# Patient Record
Sex: FEMALE | Race: BLACK OR AFRICAN AMERICAN | NOT HISPANIC OR LATINO
[De-identification: names, ages, dates, MRNs, and addresses within clinical notes are randomized per-mention and may not be internally consistent; named-entity substitution may affect disease eponyms.]

---
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
## 2025-05-22 ENCOUNTER — OFFICE (OUTPATIENT)
Dept: URBAN - METROPOLITAN AREA CLINIC 12 | Facility: CLINIC | Age: 20
Setting detail: OPHTHALMOLOGY
End: 2025-05-22
Payer: COMMERCIAL

## 2025-05-22 DIAGNOSIS — H50.51: ICD-10-CM

## 2025-05-22 DIAGNOSIS — H16.223: ICD-10-CM

## 2025-05-22 PROCEDURE — 92012 INTRM OPH EXAM EST PATIENT: CPT | Performed by: OPTOMETRIST

## 2025-05-22 ASSESSMENT — REFRACTION_CURRENTRX
OD_AXIS: 171
OS_AXIS: 012
OD_SPHERE: +6.50
OS_CYLINDER: -2.75
OD_OVR_VA: 20/
OD_CYLINDER: -2.75
OS_VPRISM_DIRECTION: SV
OS_SPHERE: +6.50
OD_VPRISM_DIRECTION: SV
OS_OVR_VA: 20/

## 2025-05-22 ASSESSMENT — SUPERFICIAL PUNCTATE KERATITIS (SPK)
OS_SPK: T
OD_SPK: T

## 2025-05-22 ASSESSMENT — REFRACTION_MANIFEST
OS_VA1: 20/20
OD_SPHERE: +5.50
OD_AXIS: 170
OS_VA1: 20/20
OD_VA1: 20/20
OD_VA1: 20/20
OS_CYLINDER: -2.75
OS_SPHERE: +5.25
OS_AXIS: 015
OD_CYLINDER: -2.75
OU_VA: 20/20

## 2025-05-22 ASSESSMENT — TONOMETRY
OS_IOP_MMHG: 12
OD_IOP_MMHG: 14

## 2025-05-22 ASSESSMENT — REFRACTION_AUTOREFRACTION
OD_AXIS: 170
OD_CYLINDER: -2.75
OD_SPHERE: +7.25
OS_CYLINDER: -3.25
OD_AXIS: 173
OS_AXIS: 015
OD_SPHERE: +7.25
OS_SPHERE: +7.50
OS_SPHERE: +7.50
OS_CYLINDER: -3.00
OS_AXIS: 019
OD_CYLINDER: -2.75

## 2025-05-22 ASSESSMENT — KERATOMETRY
OD_AXISANGLE_DEGREES: 080
OD_K2POWER_DIOPTERS: 42.75
OD_K1POWER_DIOPTERS: 39.50
OS_AXISANGLE_DEGREES: 102
OS_K1POWER_DIOPTERS: 39.25
OS_K2POWER_DIOPTERS: 43.00
METHOD_AUTO_MANUAL: AUTO

## 2025-05-22 ASSESSMENT — VISUAL ACUITY
OD_BCVA: 20/20
OS_BCVA: 20/20